# Patient Record
(demographics unavailable — no encounter records)

---

## 2025-02-05 NOTE — HISTORY OF PRESENT ILLNESS
[Disease: _____________________] : Disease: [unfilled] [AJCC Stage: ____] : AJCC Stage: [unfilled] [de-identified] : In Decemeber 2022 at age 63 y/o F with known PMHx of HTN and HLD the patient first presented to Orthopedic surgeon, Dr. Garcia, on 12/6/22 c/o persistent right knee stiffness and swelling for 2 years.  It had become more bothersome in the last 6 months.  She presented to her PCP who referred her to a podiatrist, Dr. Copeland, who ordered a duplex ultrasound on 7/28/22 and was noted to have a mass in the popliteal fossa.  She returned to her PCP who ordered a follow up MRI w/ and w/o contrast.  It was performed on the knee on 8/30/22 and demonstrated a very large multilobulated soft tissue mass on the posterior aspect of the knee, thigh and leg measuring up to 23.0 x 10.5 x 12.0 cm.  The mass appeared intramuscular extending superiorly, inferiorly and medially adjacent to the medial femoral condyle.  The mass was mostly extra-articular but did have a intraarticular component in the posterior capsular recess.  Dr. 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  The patient presented on 1/11/23 to establish oncologic care.   Pexidartinib started Early Feb 2023 but held after 2 weeks due to rise in Alk Phos 160 baseline to peak 900, pexidartinib on hold since since 2/24 Imatinib started end of April 2023  [de-identified] : diffuse type [FreeTextEntry1] : imatinib  [de-identified] : Patient was hospitalized at [facility] 10/29-11/1 for seizure in the setting of running out of her AED's. She sustained a left frontal intracranial contusion with small SAH, left anterior rib fractures #2-#4 and a left mid-clavicular fracture managed with a sling now no longer using. She had a brief stay in the ICU for desaturation due to rib fractures but recovered with pain control. Doing well since discharge, no further seizures. Saw PCP last week. Reports has no neurologist to follow up with, having a hard time finding provider due to insurance. PCP will refill Keppra until care is established. Having some L shoulder and rib pain but overall improved and using oxycodone prn, not using even daily.  With regards to TGCT, feels like mass in R knee overall smaller and softer. Tolerating imatinib with no issues.  Good energy and appetite. Sometimes has knee pain when walking but no pain at mass site.  Endorses intermittent tingling of the feet at night.  Has very mild normocytic anemia. Reports PCP started iron, B12, folic acid in August. Has been taking iron but not B12 or folic acid.

## 2025-02-05 NOTE — PHYSICAL EXAM
[Restricted in physically strenuous activity but ambulatory and able to carry out work of a light or sedentary nature] : Status 1- Restricted in physically strenuous activity but ambulatory and able to carry out work of a light or sedentary nature, e.g., light house work, office work [Normal] : affect appropriate [de-identified] : grossly intact [de-identified] : normal conjuntiva, anicteric [de-identified] : normal respiratory effort, CTAB, no audible wheeze [de-identified] : reg rate, no murmurs [de-identified] : soft, nontender [de-identified] : stable appearing knee mass, RLE swelling

## 2025-02-05 NOTE — REVIEW OF SYSTEMS
[Lower Ext Edema] : lower extremity edema [Joint Stiffness] : joint stiffness [Negative] : Psychiatric [FreeTextEntry9] : RLE swelling (improved)

## 2025-02-05 NOTE — HISTORY OF PRESENT ILLNESS
[Disease: _____________________] : Disease: [unfilled] [AJCC Stage: ____] : AJCC Stage: [unfilled] [de-identified] : In Decemeber 2022 at age 65 y/o F with known PMHx of HTN and HLD the patient first presented to Orthopedic surgeon, Dr. Garcia, on 12/6/22 c/o persistent right knee stiffness and swelling for 2 years.  It had become more bothersome in the last 6 months.  She presented to her PCP who referred her to a podiatrist, Dr. Copeland, who ordered a duplex ultrasound on 7/28/22 and was noted to have a mass in the popliteal fossa.  She returned to her PCP who ordered a follow up MRI w/ and w/o contrast.  It was performed on the knee on 8/30/22 and demonstrated a very large multilobulated soft tissue mass on the posterior aspect of the knee, thigh and leg measuring up to 23.0 x 10.5 x 12.0 cm.  The mass appeared intramuscular extending superiorly, inferiorly and medially adjacent to the medial femoral condyle.  The mass was mostly extra-articular but did have a intraarticular component in the posterior capsular recess.  Dr. 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  The patient presented on 1/11/23 to establish oncologic care.   Pexidartinib started Early Feb 2023 but held after 2 weeks due to rise in Alk Phos 160 baseline to peak 900, pexidartinib on hold since since 2/24 Imatinib started end of April 2023  [de-identified] : diffuse type [FreeTextEntry1] : imatinib  [de-identified] : Patient was hospitalized at [facility] 10/29-11/1 for seizure in the setting of running out of her AED's. She sustained a left frontal intracranial contusion with small SAH, left anterior rib fractures #2-#4 and a left mid-clavicular fracture managed with a sling now no longer using. She had a brief stay in the ICU for desaturation due to rib fractures but recovered with pain control. Doing well since discharge, no further seizures. Saw PCP last week. Reports has no neurologist to follow up with, having a hard time finding provider due to insurance. PCP will refill Keppra until care is established. Having some L shoulder and rib pain but overall improved and using oxycodone prn, not using even daily.  With regards to TGCT, feels like mass in R knee overall smaller and softer. Tolerating imatinib with no issues.  Good energy and appetite. Sometimes has knee pain when walking but no pain at mass site.  Endorses intermittent tingling of the feet at night.  Has very mild normocytic anemia. Reports PCP started iron, B12, folic acid in August. Has been taking iron but not B12 or folic acid.

## 2025-02-05 NOTE — PHYSICAL EXAM
[Restricted in physically strenuous activity but ambulatory and able to carry out work of a light or sedentary nature] : Status 1- Restricted in physically strenuous activity but ambulatory and able to carry out work of a light or sedentary nature, e.g., light house work, office work [Normal] : affect appropriate [de-identified] : grossly intact [de-identified] : normal conjuntiva, anicteric [de-identified] : normal respiratory effort, CTAB, no audible wheeze [de-identified] : reg rate, no murmurs [de-identified] : soft, nontender [de-identified] : stable appearing knee mass, RLE swelling

## 2025-02-05 NOTE — PHYSICAL EXAM
[Restricted in physically strenuous activity but ambulatory and able to carry out work of a light or sedentary nature] : Status 1- Restricted in physically strenuous activity but ambulatory and able to carry out work of a light or sedentary nature, e.g., light house work, office work [Normal] : affect appropriate [de-identified] : grossly intact [de-identified] : normal conjuntiva, anicteric [de-identified] : normal respiratory effort, CTAB, no audible wheeze [de-identified] : reg rate, no murmurs [de-identified] : soft, nontender [de-identified] : stable appearing knee mass, RLE swelling

## 2025-02-05 NOTE — HISTORY OF PRESENT ILLNESS
[Disease: _____________________] : Disease: [unfilled] [AJCC Stage: ____] : AJCC Stage: [unfilled] [de-identified] : In Decemeber 2022 at age 63 y/o F with known PMHx of HTN and HLD the patient first presented to Orthopedic surgeon, Dr. Garcia, on 12/6/22 c/o persistent right knee stiffness and swelling for 2 years.  It had become more bothersome in the last 6 months.  She presented to her PCP who referred her to a podiatrist, Dr. Copeland, who ordered a duplex ultrasound on 7/28/22 and was noted to have a mass in the popliteal fossa.  She returned to her PCP who ordered a follow up MRI w/ and w/o contrast.  It was performed on the knee on 8/30/22 and demonstrated a very large multilobulated soft tissue mass on the posterior aspect of the knee, thigh and leg measuring up to 23.0 x 10.5 x 12.0 cm.  The mass appeared intramuscular extending superiorly, inferiorly and medially adjacent to the medial femoral condyle.  The mass was mostly extra-articular but did have a intraarticular component in the posterior capsular recess.  Dr. 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  The patient presented on 1/11/23 to establish oncologic care.   Pexidartinib started Early Feb 2023 but held after 2 weeks due to rise in Alk Phos 160 baseline to peak 900, pexidartinib on hold since since 2/24 Imatinib started end of April 2023  [de-identified] : diffuse type [FreeTextEntry1] : imatinib  [de-identified] : Patient was hospitalized at [facility] 10/29-11/1 for seizure in the setting of running out of her AED's. She sustained a left frontal intracranial contusion with small SAH, left anterior rib fractures #2-#4 and a left mid-clavicular fracture managed with a sling now no longer using. She had a brief stay in the ICU for desaturation due to rib fractures but recovered with pain control. Doing well since discharge, no further seizures. Saw PCP last week. Reports has no neurologist to follow up with, having a hard time finding provider due to insurance. PCP will refill Keppra until care is established. Having some L shoulder and rib pain but overall improved and using oxycodone prn, not using even daily.  With regards to TGCT, feels like mass in R knee overall smaller and softer. Tolerating imatinib with no issues.  Good energy and appetite. Sometimes has knee pain when walking but no pain at mass site.  Endorses intermittent tingling of the feet at night.  Has very mild normocytic anemia. Reports PCP started iron, B12, folic acid in August. Has been taking iron but not B12 or folic acid.

## 2025-02-07 NOTE — BEGINNING OF VISIT
[0] : 2) Feeling down, depressed, or hopeless: Not at all (0) [PHQ-2 Negative] : PHQ-2 Negative [PHQ-9 Deferred] : PHQ-9 Deferred [[id number]] : 0 [Pain Scale: ___] : On a scale of 1-10, today the patient's pain is a(n) [unfilled]. [Never] : Never [Patient/Caregiver not ready to engage] : Patient/Caregiver not ready to engage [Abdominal Pain] : no abdominal pain [Vomiting] : no vomiting [Constipation] : no constipation [Diarrhea Character] : Diarrhea: Grade 0

## 2025-02-07 NOTE — BEGINNING OF VISIT
[0] : 2) Feeling down, depressed, or hopeless: Not at all (0) [PHQ-2 Negative] : PHQ-2 Negative [PHQ-9 Deferred] : PHQ-9 Deferred [[id number]] : 0 [Pain Scale: ___] : On a scale of 1-10, today the patient's pain is a(n) [unfilled]. [Never] : Never [Patient/Caregiver not ready to engage] : Patient/Caregiver not ready to engage [Abdominal Pain] : no abdominal pain [Vomiting] : no vomiting [Constipation] : no constipation [Diarrhea Character] : Diarrhea: Grade 0

## 2025-02-07 NOTE — BEGINNING OF VISIT
[0] : 2) Feeling down, depressed, or hopeless: Not at all (0) [PHQ-2 Negative] : PHQ-2 Negative [PHQ-9 Deferred] : PHQ-9 Deferred [[id number]] : 0 [Pain Scale: ___] : On a scale of 1-10, today the patient's pain is a(n) [unfilled]. [Never] : Never [Patient/Caregiver not ready to engage] : Patient/Caregiver not ready to engage [Abdominal Pain] : no abdominal pain [Vomiting] : no vomiting [Constipation] : no constipation [Diarrhea Character] : Diarrhea: Grade 0